# Patient Record
Sex: FEMALE | Race: WHITE
[De-identification: names, ages, dates, MRNs, and addresses within clinical notes are randomized per-mention and may not be internally consistent; named-entity substitution may affect disease eponyms.]

---

## 2020-07-06 ENCOUNTER — HOSPITAL ENCOUNTER (EMERGENCY)
Dept: HOSPITAL 56 - MW.ED | Age: 50
Discharge: HOME | End: 2020-07-06
Payer: COMMERCIAL

## 2020-07-06 DIAGNOSIS — R10.31: Primary | ICD-10-CM

## 2020-07-06 DIAGNOSIS — Z88.1: ICD-10-CM

## 2020-07-06 LAB
BUN SERPL-MCNC: 10 MG/DL (ref 7–18)
CHLORIDE SERPL-SCNC: 102 MMOL/L (ref 98–107)
CO2 SERPL-SCNC: 24.3 MMOL/L (ref 21–32)
GLUCOSE SERPL-MCNC: 101 MG/DL (ref 74–106)
POTASSIUM SERPL-SCNC: 3.8 MMOL/L (ref 3.5–5.1)
SODIUM SERPL-SCNC: 138 MMOL/L (ref 136–145)

## 2020-07-06 NOTE — EDM.PDOC
ED HPI GENERAL MEDICAL PROBLEM





- General


Chief Complaint: Abdominal Pain


Stated Complaint: POSSIBLE APPENDICITIS


Time Seen by Provider: 07/06/20 15:08


Source of Information: Reports: Patient


History Limitations: Reports: No Limitations





- History of Present Illness


INITIAL COMMENTS - FREE TEXT/NARRATIVE: 


HISTORY AND PHYSICAL:





History of present illness:


Patient is a 50-year-old female who presents to the emergency room with 

complaints of right lower quadrant abdominal pain, nausea, diarrhea x2 days.  

She describes the pain as intermittent and sharp/stabbing.  Describes it as 

similar to "labor pains".  She is concerned that she may have appendicitis.  

Patient denies any fever, chills, headache, change in vision, syncope or near 

syncope. Denies any chest pain, back pain, shortness of breath or cough. Denies 

any vaginal bleeding/discharge, vomiting, diarrhea, constipation or dysuria.  

Denies any chance of pregnancy, previous hysterectomy.  Has not noted any blood 

in urine or stool. Patient has been eating and drinking appropriately.





Review of systems: 


As per history of present illness and below otherwise all systems reviewed and 

negative.





Past medical history: 


As per history of present illness and as reviewed below otherwise 

noncontributory.





Surgical history: 


As per history of present illness and as reviewed below otherwise 

noncontributory.





Social history: 


See social history for further information





Family history: 


As per history of present illness and as reviewed below otherwise 

noncontributory.





Physical exam:


General: Well-developed and well-nourished 50-year-old female.  Alert and 

oriented.  Nontoxic-appearing and in no acute distress.


HEENT: Atraumatic, normocephalic, pupils equal and reactive bilaterally, 

negative for conjunctival pallor or scleral icterus, mucous membranes moist, 

neck supple, nontender, trachea midline. No drooling or trismus noted. No 

meningeal signs. No hot potato voice noted. 


Lungs: Clear to auscultation, breath sounds equal bilaterally, chest nontender.


Heart: S1S2, regular rate and rhythm without overt murmur


Abdomen: Soft, nondistended, diffuse tenderness in all 4 quadrants.  No rebound 

tenderness appreciated.  Negative for masses or hepatosplenomegaly. Negative for

costovertebral tenderness.


Skin: Intact, warm, dry. No lesions or rashes noted.


Extremities: Atraumatic, moves all extremities per self without difficulty or 

deficits, negative for cords or calf pain. Neurovascular unremarkable.


Neuro: Awake, alert, oriented. Cranial nerves II through XII unremarkable. 

Cerebellum unremarkable. Motor and sensory unremarkable throughout. Exam 

nonfocal.





Notes:


Lab work is unremarkable.  CT scan shows multiple calcified gallstones.  No 

additional abnormalities are noted on the CT of the abdomen and pelvis.  The 

appendix is not fully visualized but there is no inflammatory changes in the 

right lower quadrant.  I did share this information with the patient and we 

discussed signs and symptoms that would prompt her to return to the emergency 

room.  She states she is currently pain free, vital signs are stable.  I did 

offer to do outpatient pain management if her pain should return, she declines. 

Encouraged her to follow-up with her primary care provider. Supportive care 

measures were reviewed and discussed. Voices understanding and is agreeable to 

plan of care. Denies any further questions or concerns at this time.





Diagnostics:


CBC, CMP, UA, CT abd/pelvis





Therapeutics:


IV fluids





Prescription:


Declines





Impression: 


Abdominal Pain





Plan:


1. Your lab work was within normal limits.  The CT of your abdomen shows 

multiple gallstones, appendix was not fully visualized but there was no 

inflammation changes in the right lower quadrant.  Continue to monitor your 

symptoms as we discussed.  Please stick to a bland low-fat diet until you have 

been able to follow up with general surgeon. 


2. Tylenol and/or Ibuprofen as needed for pain.


3. Follow up with your primary care provider as needed and as discussed. Return 

to the ED as needed and as discussed. 





Definitive disposition and diagnosis as appropriate pending reevaluation and 

review of above.





  ** right lower abdomen


Pain Score (Numeric/FACES): 2





- Related Data


                                    Allergies











Allergy/AdvReac Type Severity Reaction Status Date / Time


 


cephapirin [From Cefadyl] Allergy  Itching Verified 07/06/20 15:31











Home Meds: 


                                    Home Meds





Multivitamin [Multivitamins] 1 each PO DAILY 07/06/20 [History]


valACYclovir [Valtrex] mg PO DAILY 07/06/20 [History]











ED ROS GENERAL





- Review of Systems


Review Of Systems: Comprehensive ROS is negative, except as noted in HPI.





ED EXAM, GI/ABD





- Physical Exam


Exam: See Below (See dictation)





Course





- Vital Signs


Last Recorded V/S: 


                                Last Vital Signs











Temp  97.4 F   07/06/20 15:28


 


Pulse  62   07/06/20 17:43


 


Resp  17   07/06/20 17:43


 


BP  128/86   07/06/20 17:43


 


Pulse Ox  98   07/06/20 17:43














- Orders/Labs/Meds


Labs: 


                                Laboratory Tests











  07/06/20 07/06/20 07/06/20 Range/Units





  15:34 15:34 15:34 


 


WBC    11.12 H  (4.0-11.0)  K/uL


 


RBC    4.63  (4.30-5.90)  M/uL


 


Hgb    14.0  (12.0-16.0)  g/dL


 


Hct    41.9  (36.0-46.0)  %


 


MCV    90.5  (80.0-98.0)  fL


 


MCH    30.2  (27.0-32.0)  pg


 


MCHC    33.4  (31.0-37.0)  g/dL


 


RDW Std Deviation    40.7  (28.0-62.0)  fl


 


RDW Coeff of [place]    12  (11.0-15.0)  %


 


Plt Count    265  (150-400)  K/uL


 


MPV    9.70  (7.40-12.00)  fL


 


Neut % (Auto)    72.0  (48.0-80.0)  %


 


Lymph % (Auto)    22.7  (16.0-40.0)  %


 


Mono % (Auto)    3.8  (0.0-15.0)  %


 


Eos % (Auto)    1.3  (0.0-7.0)  %


 


Baso % (Auto)    0.2  (0.0-1.5)  %


 


Neut # (Auto)    8.0 H  (1.4-5.7)  K/uL


 


Lymph # (Auto)    2.5 H  (0.6-2.4)  K/uL


 


Mono # (Auto)    0.4  (0.0-0.8)  K/uL


 


Eos # (Auto)    0.1  (0.0-0.7)  K/uL


 


Baso # (Auto)    0.0  (0.0-0.1)  K/uL


 


Nucleated RBC %    0.0  /100WBC


 


Nucleated RBCs #    0  K/uL


 


Sodium     (136-145)  mmol/L


 


Potassium     (3.5-5.1)  mmol/L


 


Chloride     ()  mmol/L


 


Carbon Dioxide     (21.0-32.0)  mmol/L


 


BUN     (7.0-18.0)  mg/dL


 


Creatinine     (0.6-1.0)  mg/dL


 


Est Cr Clr Drug Dosing     mL/min


 


Estimated GFR (MDRD)     ml/min


 


Glucose     ()  mg/dL


 


Calcium     (8.5-10.1)  mg/dL


 


Total Bilirubin     (0.2-1.0)  mg/dL


 


AST     (15-37)  IU/L


 


ALT     (14-63)  IU/L


 


Alkaline Phosphatase     ()  U/L


 


Total Protein     (6.4-8.2)  g/dL


 


Albumin     (3.4-5.0)  g/dL


 


Globulin     (2.6-4.0)  g/dL


 


Albumin/Globulin Ratio     (0.9-1.6)  


 


Urine Color  YELLOW    


 


Urine Appearance  CLEAR    


 


Urine pH  5.5    (5.0-8.0)  


 


Ur Specific Gravity  1.025    (1.001-1.035)  


 


Urine Protein  NEGATIVE    (NEGATIVE)  mg/dL


 


Urine Glucose (UA)  NEGATIVE    (NEGATIVE)  mg/dL


 


Urine Ketones  NEGATIVE    (NEGATIVE)  mg/dL


 


Urine Occult Blood  NEGATIVE    (NEGATIVE)  


 


Urine Nitrite  NEGATIVE    (NEGATIVE)  


 


Urine Bilirubin  NEGATIVE    (NEGATIVE)  


 


Urine Urobilinogen  0.2    (<2.0)  EU/dL


 


Ur Leukocyte Esterase  NEGATIVE    (NEGATIVE)  


 


Urine HCG, Qual   NEGATIVE   (NEGATIVE)  














  07/06/20 Range/Units





  15:34 


 


WBC   (4.0-11.0)  K/uL


 


RBC   (4.30-5.90)  M/uL


 


Hgb   (12.0-16.0)  g/dL


 


Hct   (36.0-46.0)  %


 


MCV   (80.0-98.0)  fL


 


MCH   (27.0-32.0)  pg


 


MCHC   (31.0-37.0)  g/dL


 


RDW Std Deviation   (28.0-62.0)  fl


 


RDW Coeff of [place]   (11.0-15.0)  %


 


Plt Count   (150-400)  K/uL


 


MPV   (7.40-12.00)  fL


 


Neut % (Auto)   (48.0-80.0)  %


 


Lymph % (Auto)   (16.0-40.0)  %


 


Mono % (Auto)   (0.0-15.0)  %


 


Eos % (Auto)   (0.0-7.0)  %


 


Baso % (Auto)   (0.0-1.5)  %


 


Neut # (Auto)   (1.4-5.7)  K/uL


 


Lymph # (Auto)   (0.6-2.4)  K/uL


 


Mono # (Auto)   (0.0-0.8)  K/uL


 


Eos # (Auto)   (0.0-0.7)  K/uL


 


Baso # (Auto)   (0.0-0.1)  K/uL


 


Nucleated RBC %   /100WBC


 


Nucleated RBCs #   K/uL


 


Sodium  138  (136-145)  mmol/L


 


Potassium  3.8  (3.5-5.1)  mmol/L


 


Chloride  102  ()  mmol/L


 


Carbon Dioxide  24.3  (21.0-32.0)  mmol/L


 


BUN  10  (7.0-18.0)  mg/dL


 


Creatinine  0.9  (0.6-1.0)  mg/dL


 


Est Cr Clr Drug Dosing  78.15  mL/min


 


Estimated GFR (MDRD)  > 60.0  ml/min


 


Glucose  101  ()  mg/dL


 


Calcium  8.9  (8.5-10.1)  mg/dL


 


Total Bilirubin  0.3  (0.2-1.0)  mg/dL


 


AST  23  (15-37)  IU/L


 


ALT  36  (14-63)  IU/L


 


Alkaline Phosphatase  118 H  ()  U/L


 


Total Protein  8.1  (6.4-8.2)  g/dL


 


Albumin  4.1  (3.4-5.0)  g/dL


 


Globulin  4.0  (2.6-4.0)  g/dL


 


Albumin/Globulin Ratio  1.0  (0.9-1.6)  


 


Urine Color   


 


Urine Appearance   


 


Urine pH   (5.0-8.0)  


 


Ur Specific Gravity   (1.001-1.035)  


 


Urine Protein   (NEGATIVE)  mg/dL


 


Urine Glucose (UA)   (NEGATIVE)  mg/dL


 


Urine Ketones   (NEGATIVE)  mg/dL


 


Urine Occult Blood   (NEGATIVE)  


 


Urine Nitrite   (NEGATIVE)  


 


Urine Bilirubin   (NEGATIVE)  


 


Urine Urobilinogen   (<2.0)  EU/dL


 


Ur Leukocyte Esterase   (NEGATIVE)  


 


Urine HCG, Qual   (NEGATIVE)  











Meds: 


Medications














Discontinued Medications














Generic Name Dose Route Start Last Admin





  Trade Name Freq  PRN Reason Stop Dose Admin


 


Sodium Chloride  1,000 mls @ 999 mls/hr  07/06/20 15:09  07/06/20 15:40





  Normal Saline  IV  07/06/20 16:09  999 mls/hr





  STAT ONE   Administration


 


Iopamidol  100 ml  07/06/20 18:26  07/06/20 18:26





  Isovue-370 (76%)  IVPUSH  07/06/20 18:27  100 ml





  ONETIME STA   Administration


 


Morphine Sulfate  2 mg  07/06/20 15:29  07/06/20 17:37





  Morphine  IVPUSH  07/06/20 15:30  Not Given





  ONETIME ONE  


 


Ondansetron HCl  4 mg  07/06/20 15:29  07/06/20 17:37





  Zofran  IVPUSH  07/06/20 15:30  Not Given





  ONETIME ONE  














Departure





- Departure


Time of Disposition: 17:34


Disposition: Home, Self-Care 01


Clinical Impression: 


Abdominal pain


Qualifiers:


 Abdominal location: right lower quadrant Qualified Code(s): R10.31 - Right 

lower quadrant pain








- Discharge Information


Instructions:  Abdominal Pain, Adult, Easy-to-Read


Referrals: 


Truong Chan MD [Primary Care Provider] - 


Forms:  ED Department Discharge


Additional Instructions: 


The following information is given to patients seen in the emergency department 

who are being discharged to home. This information is to outline your options 

for follow-up care. We provide all patients seen in our emergency department 

with a follow-up referral.





The need for follow-up, as well as the timing and circumstances, are variable 

depending upon the specifics of your emergency department visit.





If you don't have a primary care physician on staff, we will provide you with a 

referral. We always advise you to contact your personal physician following an 

emergency department visit to inform them of the circumstance of the visit and 

for follow-up with them and/or the need for any referrals to a consulting 

specialist.





The emergency department will also refer you to a specialist when appropriate. 

This referral assures that you have the opportunity for follow-up care with a 

specialist. All of these measure are taken in an effort to provide you with 

optimal care, which includes your follow-up.





Under all circumstances we always encourage you to contact your private daisha fish who remains a resource for coordinating your care. When calling for 

follow-up care, please make the office aware that this follow-up is from your 

recent emergency room visit. If for any reason you are refused follow-up, please

contact the [facility] Emergency Department

at [phone] and asked to speak to the emergency department charge nurse.





[facility]


Primary Care


[address] [place] [address]


Phone: [phone]


Fax: [phone]





[facility]


[address] [place] [address]


Phone: [phone]


Fax: [phone]





1. Your lab work was within normal limits.  The CT of your abdomen shows 

multiple gallstones, appendix was not fully visualized but there was no 

inflammation changes in the right lower quadrant.  Continue to monitor your 

symptoms as we discussed.  Please stick to a bland low-fat diet until you have 

been able to follow up with general surgeon. 


2. Tylenol and/or Ibuprofen as needed for pain.


3. Follow up with your primary care provider as needed and as discussed. Return 

to the ED as needed and as discussed. 








Sepsis Event Note (ED)





- Focused Exam


Vital Signs: 


                                   Vital Signs











  Temp Pulse Resp BP Pulse Ox


 


 07/06/20 17:43   62  17  128/86  98


 


 07/06/20 15:28  97.4 F  69  18  136/92 H  97

## 2020-07-06 NOTE — CT
CT abdomen and pelvis

 

Technique: Multiple axial sections were obtained from above the dome 

of the diaphragm inferiorly through the pubic symphysis.  Intravenous 

contrast was utilized.  No oral contrast has been given.

 

Comparison: No prior abdominal imaging is available.

 

Findings: Appendix not definitely visualized with certainty.

 

Visualized lung bases show nothing acute.  Liver contains no focal 

abnormality.  Multiple calcified gallstones are seen within the 

gallbladder.  Spleen size is normal.  Accessory splenic tissue is 

noted around the spleen.  Adrenal glands show no nodule.  Kidneys show

 symmetric contrast enhancement without hydronephrosis or mass.  

Pancreas shows no discrete abnormality.  Aorta shows no aneurysm.  No 

retroperitoneal adenopathy or mesenteric abnormalities are seen.  No 

pelvic mass or adenopathy is seen.  No free fluid or inflammatory 

change is appreciated.

 

Bone window settings were reviewed which shows no acute osseous 

finding.

 

Impression:

1.  Multiple calcified gallstones.

2.  No additional abnormality is appreciated on CT study of the 

abdomen and pelvis.

3.  Appendix not visualized with no inflammatory change within the 

right lower quadrant being seen.

 

Diagnostic code #2

 

This report was dictated in MDT

## 2020-09-11 ENCOUNTER — HOSPITAL ENCOUNTER (OUTPATIENT)
Dept: HOSPITAL 56 - MW.SDS | Age: 50
Discharge: HOME | End: 2020-09-11
Attending: SURGERY
Payer: COMMERCIAL

## 2020-09-11 DIAGNOSIS — Z88.1: ICD-10-CM

## 2020-09-11 DIAGNOSIS — K80.12: Primary | ICD-10-CM

## 2020-09-11 DIAGNOSIS — F17.210: ICD-10-CM

## 2020-09-11 DIAGNOSIS — F41.9: ICD-10-CM

## 2020-09-11 DIAGNOSIS — Z79.899: ICD-10-CM

## 2020-09-11 DIAGNOSIS — F32.9: ICD-10-CM

## 2020-09-11 PROCEDURE — 47562 LAPAROSCOPIC CHOLECYSTECTOMY: CPT

## 2020-09-11 PROCEDURE — 81025 URINE PREGNANCY TEST: CPT

## 2020-09-11 NOTE — PCM.OPNOTE
- General Post-Op/Procedure Note


Date of Surgery/Procedure: 09/11/20


Operative Procedure(s): lap kahlil


Findings: 





gb was yellow and green, and severe adherence, 2 large gstones and many many 

small ones; wall is not thickened; consists with chronic cholecystitis; [id number]


Pre Op Diagnosis: acute and chronic cholecystitis


Post-Op Diagnosis: Same


Anesthesia Technique: General ET Tube


Primary Surgeon: Abner Kendall


Pathology: 





sent


Complications: None


Condition: Good

## 2020-09-11 NOTE — PCM48HPAN
Post Anesthesia Note





- EVALUATION WITHIN 48HRS OF ANESTHETIC


Vital Signs in Normal Range: Yes


Patient Participated in Evaluation: Yes


Respiratory Function Stable: Yes


Airway Patent: Yes


Cardiovascular Function Stable: Yes


Hydration Status Stable: Yes


Pain Control Satisfactory: Yes


Nausea and Vomiting Control Satisfactory: Yes


Mental Status Recovered: Yes


Vital Signs: 


                                Last Vital Signs











Temp  36.2 C   09/11/20 08:58


 


Pulse  80   09/11/20 09:34


 


Resp  13   09/11/20 09:34


 


BP  118/79   09/11/20 09:34


 


Pulse Ox  93 L  09/11/20 09:34














- COMMENTS/OBSERVATIONS


Free Text/Narrative:: 





No anesthesia problems

## 2020-09-11 NOTE — PCM.PREANE
Preanesthetic Assessment





- Anesthesia/Transfusion/Family Hx


Anesthesia History: Prior Anesthesia Without Reaction


Family History of Anesthesia Reaction: No


Transfusion History: Prior Transfusion Without Reaction


Intubation History: Unknown





- Review of Systems


General: No Symptoms


Pulmonary: No Symptoms


Cardiovascular: No Symptoms


Gastrointestinal: Abdominal Pain


Neurological: No Symptoms


Other: Reports: None





- Physical Assessment


Vital Signs: 





                                Last Vital Signs











Temp  36.5 C   20 07:17


 


Pulse  60   20 07:17


 


Resp  15   20 07:17


 


BP  131/84   20 07:17


 


Pulse Ox  96   20 07:17











Height: 5 ft 9 in


Weight: 83.007 kg


ASA Class: 2


Mental Status: Alert & Oriented x3


Airway Class: Mallampati = 1


Dentition: Reports: Normal Dentition


Thyro-Mental Finger Breadths: 3


Mouth Opening Finger Breadths: 3


ROM/Head Extension: Full


Lungs: Clear to Auscultation, Normal Respiratory Effort


Cardiovascular: Regular Rate, Regular Rhythm





- Allergies


Allergies/Adverse Reactions: 


                                    Allergies











Allergy/AdvReac Type Severity Reaction Status Date / Time


 


cephapirin [From Cefadyl] Allergy  Itching Verified 20 15:41














- Blood


Blood Available: No





- Anesthesia Plan


Pre-Op Medication Ordered: None





- Acknowledgements


Anesthesia Type Planned: General Anesthesia


Pt an Appropriate Candidate for the Planned Anesthesia: Yes


Alternatives and Risks of Anesthesia Discussed w Pt/Guardian: Yes


Pt/Guardian Understands and Agrees with Anesthesia Plan: Yes





PreAnesthesia Questionnaire


HEENT History: Reports: Other (See Below)


Other HEENT History: wears glasses


Gastrointestinal History: Reports: Cholelithiasis


OB/GYN History: Reports: Pregnancy


Psychiatric History: Reports: Anxiety, Depression


Hematologic History: Reports: Blood Transfusion(s)


Immunologic History: Reports: None


Dermatologic History: Reports: Other (See Below)


Other Dermatologic History: hx of cold sores





- Infectious Disease History


Infectious Disease History: Reports: Chicken Pox





- Past Surgical History


Head Surgeries/Procedures: Reports: None


Female  Surgical History: Reports:  Section (x3), LEEP, Tubal Ligation


Other Female  Surgeries/Procedures:  x3





- SUBSTANCE USE


Smoking Status *Q: Current Some Day Smoker (< 1ppd)


Tobacco Use Within Last Twelve Months: Cigarettes


Recreational Drug Use History: No





- HOME MEDS


Home Medications: 


                                    Home Meds





Multivitamin [Multivitamins] 1 each PO DAILY 07/06/20 [History]


valACYclovir [Valtrex] 1,000 mg PO DAILY 20 [History]


buPROPion [Wellbutrin] 0 mg PO DAILY 20 [History]











- CURRENT (IN HOUSE) MEDS


Current Meds: 





                               Current Medications





Lactated Ringer's (Ringers, Lactated)  1,000 mls @ 125 mls/hr IV ASDIRECTED [facility]


Scopolamine (Transderm-Scop)  1.5 mg TRDERM Q72H PRN


   PRN Reason: Nausea





Discontinued Medications





Fentanyl (Sublimaze) Confirm Administered Dose 250 mcg .ROUTE .STK-MED ONE


   Stop: 20 07:18


Glycopyrrolate (Robinul) Confirm Administered Dose 0.4 mg .ROUTE .STK-MED ONE


   Stop: 20 07:17


Lactated Ringer's (Ringers, Lactated)  1,000 mls @ 125 mls/hr IV ASDIRECTED [facility]


Clindamycin Phosphate 600 mg/ (Premix)  50 mls @ 100 mls/hr IV ONETIME ONE


   Stop: 20 05:29


Levofloxacin/Dextrose 750 mg/ (Premix)  150 mls @ 100 mls/hr IV ONETIME ONE


   Stop: 20 06:29


Lidocaine (Xylocaine-Mpf 2%) Confirm Administered Dose 5 ml .ROUTE .STK-MED ONE


   Stop: 20 07:17


Midazolam HCl (Versed 1 Mg/Ml) Confirm Administered Dose 2 mg .ROUTE .STK-MED 

ONE


   Stop: 20 07:18


Ondansetron HCl (Zofran) Confirm Administered Dose 4 mg .ROUTE .STK-MED ONE


   Stop: 20 07:17


Propofol (Diprivan  20 Ml) Confirm Administered Dose 200 mg .ROUTE .STK-MED ONE


   Stop: 20 07:18


Rocuronium Bromide (Rocuronium Bromide) Confirm Administered Dose 50 mg .ROUTE 

.STK-MED ONE


   Stop: 20 07:18

## 2020-09-11 NOTE — PCM.POSTAN
POST ANESTHESIA ASSESSMENT





- MENTAL STATUS


Mental Status: Alert, Oriented





- VITAL SIGNS


Vital Signs: 


                                Last Vital Signs











Temp  36.2 C   09/11/20 08:58


 


Pulse  80   09/11/20 09:34


 


Resp  13   09/11/20 09:34


 


BP  118/79   09/11/20 09:34


 


Pulse Ox  93 L  09/11/20 09:34














- RESPIRATORY


Respiratory Status: Respiratory Rate WNL, Airway Patent, O2 Saturation Stable





- CARDIOVASCULAR


CV Status: Pulse Rate WNL, Blood Pressure Stable





- GASTROINTESTINAL


GI Status: No Symptoms





- PAIN


Pain Score: 1





- POST OP HYDRATION


Hydration Status: Adequate & Stable





- OBSERVATIONS


Free Text/Narrative:: 





No anesthesia problems

## 2020-09-11 NOTE — OR
SURGEON:

Abner Kendall MD

 

DATE OF PROCEDURE:  09/11/2020

 

PREOPERATIVE DIAGNOSIS:

Acute and chronic cholecystitis.

 

POSTOPERATIVE DIAGNOSIS:

Acute and chronic cholecystitis.

 

PROCEDURE PROPOSED:

Laparoscopic cholecystectomy.

 

PROCEDURE PERFORMED:

Laparoscopic cholecystectomy.

 

PRIMARY SURGEON:

Abner Kendall MD

 

COMPLICATIONS:

None.

 

FINDINGS:

Gallbladder was yellow and green and with severe adherence, consistent with

chronic cholecystitis.  Too much gallstones, many many small ones.  Wall is not

thickened.

 

PROCEDURE NOTE:

The patient was taken to the operating room and placed in the supine position.

After the intubation of general endotracheal anesthesia, the patient's abdomen

was prepped and draped in the usual sterile fashion.  Using Riptide IO, a 12 mm

trocar was placed supraumbilically and then followed with pneumoperitoneum.  A 5

mm trocar was placed in the epigastrium and two 5 mm trocars placed in the right

upper quadrant.  The placement of the last three trocars was done under direct

video supervision.

 

Upon gaining entrance to the abdominal cavity, an extensive examination was then

performed.

 

The gallbladder was located and identified and retracted to the dome of the

liver at the triangle of Calot.  The cystic duct was clipped three more times

and then using the endoscopic clip, was transected with placement of the

endoscopic clip and transection was performed with care, ensuring the posterior

prong of the instruments were clearly visualized prior to exercising the

procedure.

 

The gallbladder was dissected using electrocautery out of the liver bed and then

removed using endoscopic bag through the umbilical site.  The gallbladder was

removed en bloc and there was no bile spillage and this was then followed with

extensive irrigation until the bile was clear from blood and bile.

 

The trocars were then removed under direct video supervision.  The 12 mm

umbilical site was then closed with deep stitches using 0 Vicryl followed with

proximal stitches using 3-0 Vicryl and Dermabond.  The other three trocar sites

were closed with 3-0 Vicryl followed with approximation of skin with Dermabond.

 

The patient was then awakened and extubated and transferred to the recovery room

in hemodynamically stable condition.

 

At the conclusion of the surgery, before closing the abdominal wound, instrument

count and sponge count were done and were correct.

 

The patient tolerated the procedure well and there were no intraoperative

complications.  Dr. Kendall was present through the whole procedure.

 

Just before surgery, a timeout was called.  The patient was identified and

procedure identified and procedure started.

 

Intraoperative findings as dictated above.

 

 

FAY / MAXIMO

DD:  09/11/2020 09:26:14

DT:  09/11/2020 15:03:24

Job #:  [id number]

## 2020-09-18 ENCOUNTER — HOSPITAL ENCOUNTER (EMERGENCY)
Dept: HOSPITAL 56 - MW.ED | Age: 50
Discharge: HOME | End: 2020-09-18
Payer: COMMERCIAL

## 2020-09-18 DIAGNOSIS — F32.9: ICD-10-CM

## 2020-09-18 DIAGNOSIS — Z98.51: ICD-10-CM

## 2020-09-18 DIAGNOSIS — Z88.1: ICD-10-CM

## 2020-09-18 DIAGNOSIS — F41.9: ICD-10-CM

## 2020-09-18 DIAGNOSIS — J02.9: Primary | ICD-10-CM

## 2020-09-18 DIAGNOSIS — M54.2: ICD-10-CM

## 2020-09-18 DIAGNOSIS — Z79.899: ICD-10-CM

## 2020-09-18 LAB
BUN SERPL-MCNC: 13 MG/DL (ref 7–18)
CHLORIDE SERPL-SCNC: 104 MMOL/L (ref 98–107)
CO2 SERPL-SCNC: 27.1 MMOL/L (ref 21–32)
GLUCOSE SERPL-MCNC: 93 MG/DL (ref 74–106)
POTASSIUM SERPL-SCNC: 4.2 MMOL/L (ref 3.5–5.1)
SODIUM SERPL-SCNC: 140 MMOL/L (ref 136–145)

## 2020-09-18 PROCEDURE — 96374 THER/PROPH/DIAG INJ IV PUSH: CPT

## 2020-09-18 PROCEDURE — 85025 COMPLETE CBC W/AUTO DIFF WBC: CPT

## 2020-09-18 PROCEDURE — 87880 STREP A ASSAY W/OPTIC: CPT

## 2020-09-18 PROCEDURE — 36415 COLL VENOUS BLD VENIPUNCTURE: CPT

## 2020-09-18 PROCEDURE — 99284 EMERGENCY DEPT VISIT MOD MDM: CPT

## 2020-09-18 PROCEDURE — 70491 CT SOFT TISSUE NECK W/DYE: CPT

## 2020-09-18 PROCEDURE — 87081 CULTURE SCREEN ONLY: CPT

## 2020-09-18 PROCEDURE — 80053 COMPREHEN METABOLIC PANEL: CPT

## 2020-09-18 PROCEDURE — 81003 URINALYSIS AUTO W/O SCOPE: CPT

## 2020-09-18 NOTE — EDM.PDOC
ED HPI GENERAL MEDICAL PROBLEM





- General


Chief Complaint: General


Stated Complaint: SORE THROAT


Time Seen by Provider: 20 10:48


Source of Information: Reports: Patient


History Limitations: Reports: No Limitations





- History of Present Illness


INITIAL COMMENTS - FREE TEXT/NARRATIVE: 


HISTORY AND PHYSICAL:





History of present illness:


Patient is a 50-year-old female who presents to the ED today with concern of a 

sore throat and stiff neck over the past 2 to 3 days.  Patient states 1 week ago

she had her gallbladder removed by Dr. Tran, general surgeon.  Patient states 

that she has not needed pain medication including ibuprofen for the past 3 to 4 

days as her abdomen has been feeling much better.  Patient states that over the 

past several days she has had a sore throat that she describes as a "swollen 

swallow "sensation and states has worsened today.  Patient states she also feels

as if she cannot move her neck because it is painful.  Patient states she has 

taken 600 mg of ibuprofen before coming to the ED with mild relief of symptoms. 

Patient states that she is able to swallow but she has this sensation of feeling

like she cannot completely swallow.  Patient denies any trauma or injury.  

Patient denies fever, chills, chest pain, shortness of breath, or cough. Denies 

headache, change in vision, syncope, or near syncope. Denies nausea, vomiting, 

abdominal pain, diarrhea, constipation, or dysuria. Has not noted any blood in 

urine or stool. 





Review of systems: 


As per history of present illness and below otherwise all systems reviewed and 

negative.





Past medical history: 


As per history of present illness and as reviewed below otherwise 

noncontributory.





Surgical history: 


As per history of present illness and as reviewed below otherwise noncontributor

y.





Social history: 


See social history for further information





Family history: 


As per history of present illness and as reviewed below otherwise 

noncontributory.





Physical exam:


General: Patient is alert, oriented, and in no acute distress. Patient sitting 

comfortably on exam table but tired appearing.


HEENT: Atraumatic, normocephalic, pupils equal and reactive bilaterally, 

negative for conjunctival pallor or scleral icterus, mucous membranes moist, TMs

normal bilaterally, throat clear, uvula midline,trachea midline. No drooling or 

trismus noted. No meningeal signs. No hot potato voice noted. No 

lymphadenopathy.


Lungs: Clear to auscultation, breath sounds equal bilaterally, chest nontender.


Heart: S1S2, regular rate and rhythm without overt murmur


Abdomen: Soft, nondistended, nontender. Negative for masses or 

hepatosplenomegaly. Negative for costovertebral tenderness.


Pelvis: Stable nontender.


Genitourinary: Deferred.


Rectal: Deferred.


Skin: Intact, warm, dry. No lesions or rashes noted.


Extremities/musculoskeletal: No pain, crepitus to palpation of the complete 

spine. Does have full ROM of cervical/lumbar/thoracic spine but does have pain 

with ROM of cervical spine. Otherwise, Atraumatic, negative for cords or calf 

pain. Neurovascular unremarkable.


Neuro: Awake, alert, oriented. Cranial nerves II through XII unremarkable. 

Cerebellum unremarkable. Motor and sensory unremarkable throughout. Exam 

nonfocal.





Notes:


Patient is able to swallow water on exam.


Admission for observation was offered to patient but she declines at this time. 

Signs and symptoms that would prompt return to the ED thoroughly discussed with 

patient.


Discussed importance for follow-up with a primary care provider.


Voices understanding and is agreeable to plan of care. Denies any further 

questions or concerns at this time.





Diagnostics:


CBC, CMP, strep, soft tissue neck CT with contrast (patient declines COVID swab)





Therapeutics:


(patient declines morphine), Solumedrol





Prescription:


Azithromycin





Impression: 


Pharyngitis





Plan:


1.  Take medication as prescribed.  You can alternate ibuprofen and Tylenol as 

directed for pain and discomfort.


2.  Follow-up with a primary care provider as discussed.  Return to the ED as 

needed and as discussed.





Definitive disposition and diagnosis as appropriate pending reevaluation and 

review of above.





  ** neck


Pain Score (Numeric/FACES): 5





- Related Data


                                    Allergies











Allergy/AdvReac Type Severity Reaction Status Date / Time


 


cephapirin [From Cefadyl] Allergy  Itching Verified 20 11:03











Home Meds: 


                                    Home Meds





Multivitamin [Multivitamins] 1 each PO DAILY 20 [History]


valACYclovir [Valtrex] 1,000 mg PO DAILY 20 [History]


buPROPion [Wellbutrin] 0 mg PO DAILY 20 [History]











Past Medical History


HEENT History: Reports: Other (See Below)


Other HEENT History: wears glasses


Gastrointestinal History: Reports: Cholelithiasis


OB/GYN History: Reports: Pregnancy


Psychiatric History: Reports: Anxiety, Depression


Hematologic History: Reports: Blood Transfusion(s)


Immunologic History: Reports: None


Dermatologic History: Reports: Other (See Below)


Other Dermatologic History: hx of cold sores





- Infectious Disease History


Infectious Disease History: Reports: Chicken Pox





- Past Surgical History


Head Surgeries/Procedures: Reports: None


Female  Surgical History: Reports:  Section (x3), LEEP, Tubal Ligation


Other Female  Surgeries/Procedures:  x3





Social & Family History





- Family History


Family Medical History: Noncontributory





- Caffeine Use


Caffeine Use: Reports: Coffee





ED ROS GENERAL





- Review of Systems


Review Of Systems: Comprehensive ROS is negative, except as noted in HPI.





ED EXAM, GENERAL





- Physical Exam


Exam: See Below (see dictation)





Course





- Vital Signs


Last Recorded V/S: 


                                Last Vital Signs











Temp  97.2 F   20 10:58


 


Pulse  76   20 10:58


 


Resp  17   20 10:58


 


BP  136/95 H  20 10:58


 


Pulse Ox  96   20 10:58














- Orders/Labs/Meds


Orders: 


                               Active Orders 24 hr











 Category Date Time Status


 


 CULTURE STREP A CONFIRMATION [RM] Stat Lab  20 12:43 Results


 


 STREP SCRN A RAPID W CULT CONF [RM] Stat Lab  20 12:43 Results


 


 UA RFX MICHAEL AND CULT IF INDIC [URIN] Stat Lab  20 11:20 Ordered


 


 Sodium Chloride 0.9% [Saline Flush] Med  20 11:20 Active





 10 ml FLUSH ASDIRECTED PRN   


 


 Sodium Chloride 0.9% [Saline Flush] Med  20 11:20 Active





 2.5 ml FLUSH ASDIRECTED PRN   


 


 Saline Lock Insert [OM.PC] Stat Oth  20 11:20 Ordered








                                Medication Orders





Sodium Chloride (Saline Flush)  10 ml FLUSH ASDIRECTED PRN


   PRN Reason: Keep Vein Open


Sodium Chloride (Saline Flush)  2.5 ml FLUSH ASDIRECTED PRN


   PRN Reason: Keep Vein Open








Labs: 


                                Laboratory Tests











  20 Range/Units





  11:45 11:45 


 


WBC  11.27 H   (4.0-11.0)  K/uL


 


RBC  4.45   (4.30-5.90)  M/uL


 


Hgb  13.3   (12.0-16.0)  g/dL


 


Hct  40.2   (36.0-46.0)  %


 


MCV  90.3   (80.0-98.0)  fL


 


MCH  29.9   (27.0-32.0)  pg


 


MCHC  33.1   (31.0-37.0)  g/dL


 


RDW Std Deviation  40.1   (28.0-62.0)  fl


 


RDW Coeff of [place]  12   (11.0-15.0)  %


 


Plt Count  302   (150-400)  K/uL


 


MPV  9.50   (7.40-12.00)  fL


 


Neut % (Auto)  71.8   (48.0-80.0)  %


 


Lymph % (Auto)  20.2   (16.0-40.0)  %


 


Mono % (Auto)  4.1   (0.0-15.0)  %


 


Eos % (Auto)  3.5   (0.0-7.0)  %


 


Baso % (Auto)  0.4   (0.0-1.5)  %


 


Neut # (Auto)  8.1 H   (1.4-5.7)  K/uL


 


Lymph # (Auto)  2.3   (0.6-2.4)  K/uL


 


Mono # (Auto)  0.5   (0.0-0.8)  K/uL


 


Eos # (Auto)  0.4   (0.0-0.7)  K/uL


 


Baso # (Auto)  0.1   (0.0-0.1)  K/uL


 


Nucleated RBC %  0.0   /100WBC


 


Nucleated RBCs #  0   K/uL


 


Sodium   140  (136-145)  mmol/L


 


Potassium   4.2  (3.5-5.1)  mmol/L


 


Chloride   104  ()  mmol/L


 


Carbon Dioxide   27.1  (21.0-32.0)  mmol/L


 


BUN   13  (7.0-18.0)  mg/dL


 


Creatinine   0.9  (0.6-1.0)  mg/dL


 


Est Cr Clr Drug Dosing   78.15  mL/min


 


Estimated GFR (MDRD)   > 60.0  ml/min


 


Glucose   93  ()  mg/dL


 


Calcium   9.2  (8.5-10.1)  mg/dL


 


Total Bilirubin   0.4  (0.2-1.0)  mg/dL


 


AST   21  (15-37)  IU/L


 


ALT   48  (14-63)  IU/L


 


Alkaline Phosphatase   147 H  ()  U/L


 


Total Protein   8.8 H  (6.4-8.2)  g/dL


 


Albumin   4.2  (3.4-5.0)  g/dL


 


Globulin   4.6 H  (2.6-4.0)  g/dL


 


Albumin/Globulin Ratio   0.9  (0.9-1.6)  











Meds: 


Medications











Generic Name Dose Route Start Last Admin





  Trade Name Freq  PRN Reason Stop Dose Admin


 


Sodium Chloride  10 ml  20 11:20 





  Saline Flush  FLUSH  





  ASDIRECTED PRN  





  Keep Vein Open  


 


Sodium Chloride  2.5 ml  20 11:20 





  Saline Flush  FLUSH  





  ASDIRECTED PRN  





  Keep Vein Open  














Discontinued Medications














Generic Name Dose Route Start Last Admin





  Trade Name Freq  PRN Reason Stop Dose Admin


 


Iopamidol  75 ml  20 13:52  20 13:52





  Isovue Multipack-370 (76%)  IVPUSH  20 13:53  75 ml





  ONETIME ONE   Administration


 


Methylprednisolone Sodium Succinate  125 mg  20 14:15 





  Solu-Medrol  IVPUSH  20 14:16 





  ONETIME ONE  


 


Morphine Sulfate  2 mg  20 11:20  20 11:52





  Morphine  IVPUSH  20 11:21  Not Given





  ONETIME ONE  














Departure





- Departure


Time of Disposition: 14:28


Disposition: Home, Self-Care 01


Clinical Impression: 


Pharyngitis


Qualifiers:


 Pharyngitis/tonsillitis etiology: unspecified etiology Qualified Code(s): J02.9

 - Acute pharyngitis, unspecified








- Discharge Information


Referrals: 


PCP,None [Primary Care Provider] - 


Forms:  ED Department Discharge


Additional Instructions: 


The following information is given to patients seen in the emergency department 

who are being discharged to home. This information is to outline your options 

for follow-up care. We provide all patients seen in our emergency department 

with a follow-up referral.





The need for follow-up, as well as the timing and circumstances, are variable 

depending upon the specifics of your emergency department visit.





If you don't have a primary care physician on staff, we will provide you with a 

referral. We always advise you to contact your personal physician following an 

emergency department visit to inform them of the circumstance of the visit and 

for follow-up with them and/or the need for any referrals to a consulting 

specialist.





The emergency department will also refer you to a specialist when appropriate. 

This referral assures that you have the opportunity for follow-up care with a 

specialist. All of these measure are taken in an effort to provide you with 

optimal care, which includes your follow-up.





Under all circumstances we always encourage you to contact your private 

physician who remains a resource for coordinating your care. When calling for 

follow-up care, please make the office aware that this follow-up is from your 

recent emergency room visit. If for any reason you are refused follow-up, please

contact the [facility] Emergency Department

at [phone] and asked to speak to the emergency department charge nurse.





[facility]


Primary Care


[address] [place], [place] [id number]


Phone: [phone]


Fax: [phone]





[place] [address]


Phone: [phone]


Fax: [phone]





1.  Take medication as prescribed.  You can alternate ibuprofen and Tylenol as 

directed for pain and discomfort.


2.  Follow-up with a primary care provider as discussed.  Return to the ED as 

needed and as discussed.


 














Sepsis Event Note (ED)





- Evaluation


Sepsis Screening Result: No Definite Risk





- Focused Exam


Vital Signs: 


                                   Vital Signs











  Temp Pulse Resp BP Pulse Ox


 


 20 10:58  97.2 F  76  17  136/95 H  96














- My Orders


Last 24 Hours: 


My Active Orders





20 11:20


UA RFX MICHAEL AND CULT IF INDIC [URIN] Stat 


Sodium Chloride 0.9% [Saline Flush]   10 ml FLUSH ASDIRECTED PRN 


Sodium Chloride 0.9% [Saline Flush]   2.5 ml FLUSH ASDIRECTED PRN 


Saline Lock Insert [OM.PC] Stat 





20 12:43


CULTURE STREP A CONFIRMATION [RM] Stat 


STREP SCRN A RAPID W CULT CONF [RM] Stat 














- Assessment/Plan


Last 24 Hours: 


My Active Orders





20 11:20


UA RFX MICHAEL AND CULT IF INDIC [URIN] Stat 


Sodium Chloride 0.9% [Saline Flush]   10 ml FLUSH ASDIRECTED PRN 


Sodium Chloride 0.9% [Saline Flush]   2.5 ml FLUSH ASDIRECTED PRN 


Saline Lock Insert [OM.PC] Stat 





20 12:43


CULTURE STREP A CONFIRMATION [RM] Stat 


STREP SCRN A RAPID W CULT CONF [RM] Stat

## 2020-09-18 NOTE — CT
CT neck

 

Technique: Multiple axial sections through the neck were obtained.  

Intravenous contrast was utilized.  Reconstructed coronal and sagittal

 images were reviewed.

 

Limitations: Detail slightly limited due to artifact caused by dental 

hardware.

 

Findings: Visualized lung apices are clear.  Thyroid gland appears 

normal.  Submandibular and parotid salivary glands appear within 

normal limits.

 

Soft tissue thickening is noted within the peritonsillar and 

parapharyngeal regions.  Mild prevertebral soft tissue swelling is 

seen.  Epiglottis appears to be normal.  There is some soft tissue 

thickening within the glottis.

 

Slightly prominent scattered lymph nodes within the neck most likely 

relating to reactive lymph nodes from the parapharyngeal process.

 

Impression:

1.  Findings felt compatible with rather diffuse soft tissue swelling 

within the parapharyngeal, peritonsillar, prevertebral and glottic 

regions.  Findings presumably are due to infection which could be 

viral.

2.  Artifact makes evaluation somewhat difficult due to dental 

hardware.

3.  Nothing definite is appreciated to indicate abscess.

4.  Slightly prominent lymph nodes believed to be reactive from the 

parapharyngeal process.

 

Diagnostic code #5

 

This report was dictated in MDT